# Patient Record
Sex: FEMALE | Race: WHITE | Employment: FULL TIME | URBAN - METROPOLITAN AREA
[De-identification: names, ages, dates, MRNs, and addresses within clinical notes are randomized per-mention and may not be internally consistent; named-entity substitution may affect disease eponyms.]

---

## 2023-05-26 LAB — TREPONEMA PALLIDUM ANTIBODY (EXTERNAL): NONREACTIVE

## 2023-07-24 LAB — GROUP B STREPTOCOCCUS (EXTERNAL): POSITIVE

## 2023-07-31 RX ORDER — PROMETHAZINE HYDROCHLORIDE 25 MG/1
25 TABLET ORAL EVERY 6 HOURS PRN
COMMUNITY

## 2023-07-31 RX ORDER — ASPIRIN 81 MG/1
81 TABLET ORAL DAILY
Status: ON HOLD | COMMUNITY
End: 2023-08-10

## 2023-07-31 RX ORDER — VITAMIN A ACETATE, .BETA.-CAROTENE, ASCORBIC ACID, CHOLECALCIFEROL, .ALPHA.-TOCOPHEROL ACETATE, DL-, THIAMINE MONONITRATE, RIBOFLAVIN, NIACINAMIDE, PYRIDOXINE HYDROCHLORIDE, FOLIC ACID, CYANOCOBALAMIN, CALCIUM CARBONATE, FERROUS FUMARATE, ZINC OXIDE, AND CUPRIC OXIDE 2000; 2000; 120; 400; 22; 1.84; 3; 20; 10; 1; 12; 200; 27; 25; 2 [IU]/1; [IU]/1; MG/1; [IU]/1; MG/1; MG/1; MG/1; MG/1; MG/1; MG/1; UG/1; MG/1; MG/1; MG/1; MG/1
1 TABLET ORAL DAILY
COMMUNITY

## 2023-08-05 NOTE — PHARMACY-ADMISSION MEDICATION HISTORY
Medication history and patient interview completed by pre-admitting nurse. Reviewed by pharmacist. No further clarifications needed.    Pasha Cuello RPh  ------------------------------------------------------  Status Changed by Time of change   Nurse Pinky Huerta RN Mon Jul 31, 2023  4:27 PM     Prior to Admission medications    Medication Sig Last Dose Taking? Auth Provider Long Term End Date   aspirin 81 MG EC tablet Take 81 mg by mouth daily  Yes Reported, Patient     Prenatal Vit-Fe Fumarate-FA (PNV PRENATAL PLUS MULTIVITAMIN) 27-1 MG TABS per tablet Take 1 tablet by mouth daily  Yes Reported, Patient     promethazine (PHENERGAN) 25 MG tablet Take 25 mg by mouth every 6 hours as needed for nausea  Yes Reported, Patient

## 2023-08-08 ENCOUNTER — ANESTHESIA (OUTPATIENT)
Dept: OBGYN | Facility: CLINIC | Age: 28
End: 2023-08-08
Payer: COMMERCIAL

## 2023-08-08 ENCOUNTER — ANESTHESIA EVENT (OUTPATIENT)
Dept: OBGYN | Facility: CLINIC | Age: 28
End: 2023-08-08
Payer: COMMERCIAL

## 2023-08-08 ENCOUNTER — HOSPITAL ENCOUNTER (INPATIENT)
Facility: CLINIC | Age: 28
LOS: 2 days | Discharge: HOME HEALTH | End: 2023-08-10
Attending: OBSTETRICS & GYNECOLOGY | Admitting: OBSTETRICS & GYNECOLOGY
Payer: COMMERCIAL

## 2023-08-08 LAB
ABO/RH(D): NORMAL
ANTIBODY SCREEN: NEGATIVE
ERYTHROCYTE [DISTWIDTH] IN BLOOD BY AUTOMATED COUNT: 13.4 % (ref 10–15)
HCT VFR BLD AUTO: 37.3 % (ref 35–47)
HGB BLD-MCNC: 12.2 G/DL (ref 11.7–15.7)
MCH RBC QN AUTO: 29.3 PG (ref 26.5–33)
MCHC RBC AUTO-ENTMCNC: 32.7 G/DL (ref 31.5–36.5)
MCV RBC AUTO: 89 FL (ref 78–100)
PLATELET # BLD AUTO: 251 10E3/UL (ref 150–450)
RBC # BLD AUTO: 4.17 10E6/UL (ref 3.8–5.2)
SPECIMEN EXPIRATION DATE: NORMAL
T PALLIDUM AB SER QL: NONREACTIVE
WBC # BLD AUTO: 11.3 10E3/UL (ref 4–11)

## 2023-08-08 PROCEDURE — 250N000011 HC RX IP 250 OP 636: Performed by: ANESTHESIOLOGY

## 2023-08-08 PROCEDURE — 86780 TREPONEMA PALLIDUM: CPT | Performed by: OBSTETRICS & GYNECOLOGY

## 2023-08-08 PROCEDURE — 86850 RBC ANTIBODY SCREEN: CPT | Performed by: OBSTETRICS & GYNECOLOGY

## 2023-08-08 PROCEDURE — 710N000010 HC RECOVERY PHASE 1, LEVEL 2, PER MIN: Performed by: OBSTETRICS & GYNECOLOGY

## 2023-08-08 PROCEDURE — 258N000003 HC RX IP 258 OP 636: Performed by: OBSTETRICS & GYNECOLOGY

## 2023-08-08 PROCEDURE — 85014 HEMATOCRIT: CPT | Performed by: OBSTETRICS & GYNECOLOGY

## 2023-08-08 PROCEDURE — 999N000079 HC STATISTIC IP LACTATION SERVICES 1-15 MIN

## 2023-08-08 PROCEDURE — 250N000011 HC RX IP 250 OP 636: Mod: JZ | Performed by: NURSE ANESTHETIST, CERTIFIED REGISTERED

## 2023-08-08 PROCEDURE — 120N000001 HC R&B MED SURG/OB

## 2023-08-08 PROCEDURE — 250N000009 HC RX 250: Performed by: NURSE ANESTHETIST, CERTIFIED REGISTERED

## 2023-08-08 PROCEDURE — 250N000011 HC RX IP 250 OP 636: Mod: JZ | Performed by: OBSTETRICS & GYNECOLOGY

## 2023-08-08 PROCEDURE — 258N000003 HC RX IP 258 OP 636: Performed by: NURSE ANESTHETIST, CERTIFIED REGISTERED

## 2023-08-08 PROCEDURE — 272N000001 HC OR GENERAL SUPPLY STERILE: Performed by: OBSTETRICS & GYNECOLOGY

## 2023-08-08 PROCEDURE — 250N000013 HC RX MED GY IP 250 OP 250 PS 637: Performed by: OBSTETRICS & GYNECOLOGY

## 2023-08-08 PROCEDURE — 250N000011 HC RX IP 250 OP 636: Performed by: OBSTETRICS & GYNECOLOGY

## 2023-08-08 PROCEDURE — 360N000076 HC SURGERY LEVEL 3, PER MIN: Performed by: OBSTETRICS & GYNECOLOGY

## 2023-08-08 PROCEDURE — 10D17ZZ EXTRACTION OF PRODUCTS OF CONCEPTION, RETAINED, VIA NATURAL OR ARTIFICIAL OPENING: ICD-10-PCS | Performed by: OBSTETRICS & GYNECOLOGY

## 2023-08-08 PROCEDURE — 370N000017 HC ANESTHESIA TECHNICAL FEE, PER MIN: Performed by: OBSTETRICS & GYNECOLOGY

## 2023-08-08 RX ORDER — AMOXICILLIN 250 MG
2 CAPSULE ORAL 2 TIMES DAILY
Status: DISCONTINUED | OUTPATIENT
Start: 2023-08-08 | End: 2023-08-10 | Stop reason: HOSPADM

## 2023-08-08 RX ORDER — SODIUM CHLORIDE, SODIUM LACTATE, POTASSIUM CHLORIDE, CALCIUM CHLORIDE 600; 310; 30; 20 MG/100ML; MG/100ML; MG/100ML; MG/100ML
INJECTION, SOLUTION INTRAVENOUS CONTINUOUS
Status: DISCONTINUED | OUTPATIENT
Start: 2023-08-08 | End: 2023-08-08 | Stop reason: HOSPADM

## 2023-08-08 RX ORDER — ONDANSETRON 4 MG/1
4 TABLET, ORALLY DISINTEGRATING ORAL EVERY 6 HOURS PRN
Status: DISCONTINUED | OUTPATIENT
Start: 2023-08-08 | End: 2023-08-10 | Stop reason: HOSPADM

## 2023-08-08 RX ORDER — OXYTOCIN 10 [USP'U]/ML
10 INJECTION, SOLUTION INTRAMUSCULAR; INTRAVENOUS
Status: DISCONTINUED | OUTPATIENT
Start: 2023-08-08 | End: 2023-08-10 | Stop reason: HOSPADM

## 2023-08-08 RX ORDER — ACETAMINOPHEN 325 MG/1
975 TABLET ORAL EVERY 6 HOURS
Status: DISCONTINUED | OUTPATIENT
Start: 2023-08-08 | End: 2023-08-10 | Stop reason: HOSPADM

## 2023-08-08 RX ORDER — METOCLOPRAMIDE 10 MG/1
10 TABLET ORAL EVERY 6 HOURS PRN
Status: DISCONTINUED | OUTPATIENT
Start: 2023-08-08 | End: 2023-08-10 | Stop reason: HOSPADM

## 2023-08-08 RX ORDER — LIDOCAINE 40 MG/G
CREAM TOPICAL
Status: DISCONTINUED | OUTPATIENT
Start: 2023-08-08 | End: 2023-08-08 | Stop reason: HOSPADM

## 2023-08-08 RX ORDER — ONDANSETRON 2 MG/ML
4 INJECTION INTRAMUSCULAR; INTRAVENOUS EVERY 6 HOURS PRN
Status: DISCONTINUED | OUTPATIENT
Start: 2023-08-08 | End: 2023-08-10 | Stop reason: HOSPADM

## 2023-08-08 RX ORDER — FENTANYL CITRATE-0.9 % NACL/PF 10 MCG/ML
100 PLASTIC BAG, INJECTION (ML) INTRAVENOUS EVERY 5 MIN PRN
Status: DISCONTINUED | OUTPATIENT
Start: 2023-08-08 | End: 2023-08-08 | Stop reason: HOSPADM

## 2023-08-08 RX ORDER — MORPHINE SULFATE 1 MG/ML
INJECTION, SOLUTION EPIDURAL; INTRATHECAL; INTRAVENOUS
Status: COMPLETED | OUTPATIENT
Start: 2023-08-08 | End: 2023-08-08

## 2023-08-08 RX ORDER — METHYLERGONOVINE MALEATE 0.2 MG/ML
200 INJECTION INTRAVENOUS
Status: DISCONTINUED | OUTPATIENT
Start: 2023-08-08 | End: 2023-08-08 | Stop reason: HOSPADM

## 2023-08-08 RX ORDER — METOCLOPRAMIDE HYDROCHLORIDE 5 MG/ML
10 INJECTION INTRAMUSCULAR; INTRAVENOUS EVERY 6 HOURS PRN
Status: DISCONTINUED | OUTPATIENT
Start: 2023-08-08 | End: 2023-08-10 | Stop reason: HOSPADM

## 2023-08-08 RX ORDER — MISOPROSTOL 200 UG/1
400 TABLET ORAL
Status: DISCONTINUED | OUTPATIENT
Start: 2023-08-08 | End: 2023-08-10 | Stop reason: HOSPADM

## 2023-08-08 RX ORDER — CEFAZOLIN SODIUM/WATER 3 G/30 ML
3 SYRINGE (ML) INTRAVENOUS
Status: COMPLETED | OUTPATIENT
Start: 2023-08-08 | End: 2023-08-08

## 2023-08-08 RX ORDER — ENOXAPARIN SODIUM 100 MG/ML
40 INJECTION SUBCUTANEOUS EVERY 12 HOURS
Status: DISCONTINUED | OUTPATIENT
Start: 2023-08-08 | End: 2023-08-10 | Stop reason: HOSPADM

## 2023-08-08 RX ORDER — NALOXONE HYDROCHLORIDE 0.4 MG/ML
0.2 INJECTION, SOLUTION INTRAMUSCULAR; INTRAVENOUS; SUBCUTANEOUS
Status: DISCONTINUED | OUTPATIENT
Start: 2023-08-08 | End: 2023-08-08

## 2023-08-08 RX ORDER — OXYTOCIN/0.9 % SODIUM CHLORIDE 30/500 ML
100-340 PLASTIC BAG, INJECTION (ML) INTRAVENOUS CONTINUOUS PRN
Status: DISCONTINUED | OUTPATIENT
Start: 2023-08-08 | End: 2023-08-10 | Stop reason: HOSPADM

## 2023-08-08 RX ORDER — NALBUPHINE HYDROCHLORIDE 20 MG/ML
2.5-5 INJECTION, SOLUTION INTRAMUSCULAR; INTRAVENOUS; SUBCUTANEOUS EVERY 6 HOURS PRN
Status: DISCONTINUED | OUTPATIENT
Start: 2023-08-08 | End: 2023-08-08

## 2023-08-08 RX ORDER — OXYTOCIN/0.9 % SODIUM CHLORIDE 30/500 ML
340 PLASTIC BAG, INJECTION (ML) INTRAVENOUS CONTINUOUS PRN
Status: DISCONTINUED | OUTPATIENT
Start: 2023-08-08 | End: 2023-08-08 | Stop reason: HOSPADM

## 2023-08-08 RX ORDER — CARBOPROST TROMETHAMINE 250 UG/ML
250 INJECTION, SOLUTION INTRAMUSCULAR
Status: DISCONTINUED | OUTPATIENT
Start: 2023-08-08 | End: 2023-08-08 | Stop reason: HOSPADM

## 2023-08-08 RX ORDER — SODIUM CHLORIDE, SODIUM LACTATE, POTASSIUM CHLORIDE, CALCIUM CHLORIDE 600; 310; 30; 20 MG/100ML; MG/100ML; MG/100ML; MG/100ML
INJECTION, SOLUTION INTRAVENOUS CONTINUOUS PRN
Status: DISCONTINUED | OUTPATIENT
Start: 2023-08-08 | End: 2023-08-08

## 2023-08-08 RX ORDER — IBUPROFEN 800 MG/1
800 TABLET, FILM COATED ORAL EVERY 6 HOURS
Status: DISCONTINUED | OUTPATIENT
Start: 2023-08-09 | End: 2023-08-10 | Stop reason: HOSPADM

## 2023-08-08 RX ORDER — CARBOPROST TROMETHAMINE 250 UG/ML
250 INJECTION, SOLUTION INTRAMUSCULAR
Status: DISCONTINUED | OUTPATIENT
Start: 2023-08-08 | End: 2023-08-10 | Stop reason: HOSPADM

## 2023-08-08 RX ORDER — SIMETHICONE 80 MG
80 TABLET,CHEWABLE ORAL 4 TIMES DAILY PRN
Status: DISCONTINUED | OUTPATIENT
Start: 2023-08-08 | End: 2023-08-10 | Stop reason: HOSPADM

## 2023-08-08 RX ORDER — PROCHLORPERAZINE 25 MG
25 SUPPOSITORY, RECTAL RECTAL EVERY 12 HOURS PRN
Status: DISCONTINUED | OUTPATIENT
Start: 2023-08-08 | End: 2023-08-10 | Stop reason: HOSPADM

## 2023-08-08 RX ORDER — HYDROCORTISONE 25 MG/G
CREAM TOPICAL 3 TIMES DAILY PRN
Status: DISCONTINUED | OUTPATIENT
Start: 2023-08-08 | End: 2023-08-10 | Stop reason: HOSPADM

## 2023-08-08 RX ORDER — OXYTOCIN 10 [USP'U]/ML
10 INJECTION, SOLUTION INTRAMUSCULAR; INTRAVENOUS
Status: DISCONTINUED | OUTPATIENT
Start: 2023-08-08 | End: 2023-08-08 | Stop reason: HOSPADM

## 2023-08-08 RX ORDER — NALOXONE HYDROCHLORIDE 0.4 MG/ML
0.4 INJECTION, SOLUTION INTRAMUSCULAR; INTRAVENOUS; SUBCUTANEOUS
Status: DISCONTINUED | OUTPATIENT
Start: 2023-08-08 | End: 2023-08-08

## 2023-08-08 RX ORDER — CEFAZOLIN SODIUM/WATER 3 G/30 ML
3 SYRINGE (ML) INTRAVENOUS SEE ADMIN INSTRUCTIONS
Status: DISCONTINUED | OUTPATIENT
Start: 2023-08-08 | End: 2023-08-08 | Stop reason: HOSPADM

## 2023-08-08 RX ORDER — MISOPROSTOL 200 UG/1
400 TABLET ORAL
Status: DISCONTINUED | OUTPATIENT
Start: 2023-08-08 | End: 2023-08-08 | Stop reason: HOSPADM

## 2023-08-08 RX ORDER — BUPIVACAINE HYDROCHLORIDE 7.5 MG/ML
INJECTION, SOLUTION INTRASPINAL
Status: COMPLETED | OUTPATIENT
Start: 2023-08-08 | End: 2023-08-08

## 2023-08-08 RX ORDER — PROCHLORPERAZINE MALEATE 10 MG
10 TABLET ORAL EVERY 6 HOURS PRN
Status: DISCONTINUED | OUTPATIENT
Start: 2023-08-08 | End: 2023-08-10 | Stop reason: HOSPADM

## 2023-08-08 RX ORDER — OXYCODONE HYDROCHLORIDE 5 MG/1
5 TABLET ORAL EVERY 4 HOURS PRN
Status: DISCONTINUED | OUTPATIENT
Start: 2023-08-08 | End: 2023-08-10 | Stop reason: HOSPADM

## 2023-08-08 RX ORDER — TRANEXAMIC ACID 10 MG/ML
1 INJECTION, SOLUTION INTRAVENOUS EVERY 30 MIN PRN
Status: DISCONTINUED | OUTPATIENT
Start: 2023-08-08 | End: 2023-08-10 | Stop reason: HOSPADM

## 2023-08-08 RX ORDER — MODIFIED LANOLIN
OINTMENT (GRAM) TOPICAL
Status: DISCONTINUED | OUTPATIENT
Start: 2023-08-08 | End: 2023-08-10 | Stop reason: HOSPADM

## 2023-08-08 RX ORDER — EPHEDRINE SULFATE 50 MG/ML
INJECTION, SOLUTION INTRAMUSCULAR; INTRAVENOUS; SUBCUTANEOUS PRN
Status: DISCONTINUED | OUTPATIENT
Start: 2023-08-08 | End: 2023-08-08

## 2023-08-08 RX ORDER — MISOPROSTOL 200 UG/1
800 TABLET ORAL
Status: DISCONTINUED | OUTPATIENT
Start: 2023-08-08 | End: 2023-08-10 | Stop reason: HOSPADM

## 2023-08-08 RX ORDER — OXYTOCIN/0.9 % SODIUM CHLORIDE 30/500 ML
340 PLASTIC BAG, INJECTION (ML) INTRAVENOUS CONTINUOUS PRN
Status: DISCONTINUED | OUTPATIENT
Start: 2023-08-08 | End: 2023-08-10 | Stop reason: HOSPADM

## 2023-08-08 RX ORDER — ACETAMINOPHEN 325 MG/1
975 TABLET ORAL ONCE
Status: COMPLETED | OUTPATIENT
Start: 2023-08-08 | End: 2023-08-08

## 2023-08-08 RX ORDER — BISACODYL 10 MG
10 SUPPOSITORY, RECTAL RECTAL DAILY PRN
Status: DISCONTINUED | OUTPATIENT
Start: 2023-08-10 | End: 2023-08-10 | Stop reason: HOSPADM

## 2023-08-08 RX ORDER — KETOROLAC TROMETHAMINE 30 MG/ML
INJECTION, SOLUTION INTRAMUSCULAR; INTRAVENOUS PRN
Status: DISCONTINUED | OUTPATIENT
Start: 2023-08-08 | End: 2023-08-08

## 2023-08-08 RX ORDER — OXYTOCIN/0.9 % SODIUM CHLORIDE 30/500 ML
PLASTIC BAG, INJECTION (ML) INTRAVENOUS PRN
Status: DISCONTINUED | OUTPATIENT
Start: 2023-08-08 | End: 2023-08-08

## 2023-08-08 RX ORDER — ONDANSETRON 2 MG/ML
INJECTION INTRAMUSCULAR; INTRAVENOUS PRN
Status: DISCONTINUED | OUTPATIENT
Start: 2023-08-08 | End: 2023-08-08

## 2023-08-08 RX ORDER — METHYLERGONOVINE MALEATE 0.2 MG/ML
200 INJECTION INTRAVENOUS
Status: DISCONTINUED | OUTPATIENT
Start: 2023-08-08 | End: 2023-08-10 | Stop reason: HOSPADM

## 2023-08-08 RX ORDER — KETOROLAC TROMETHAMINE 30 MG/ML
30 INJECTION, SOLUTION INTRAMUSCULAR; INTRAVENOUS EVERY 6 HOURS
Status: COMPLETED | OUTPATIENT
Start: 2023-08-08 | End: 2023-08-09

## 2023-08-08 RX ORDER — CITRIC ACID/SODIUM CITRATE 334-500MG
30 SOLUTION, ORAL ORAL
Status: COMPLETED | OUTPATIENT
Start: 2023-08-08 | End: 2023-08-08

## 2023-08-08 RX ORDER — AMOXICILLIN 250 MG
1 CAPSULE ORAL 2 TIMES DAILY
Status: DISCONTINUED | OUTPATIENT
Start: 2023-08-08 | End: 2023-08-10 | Stop reason: HOSPADM

## 2023-08-08 RX ORDER — TRANEXAMIC ACID 10 MG/ML
1 INJECTION, SOLUTION INTRAVENOUS EVERY 30 MIN PRN
Status: DISCONTINUED | OUTPATIENT
Start: 2023-08-08 | End: 2023-08-08 | Stop reason: HOSPADM

## 2023-08-08 RX ORDER — FENTANYL CITRATE 50 UG/ML
INJECTION, SOLUTION INTRAMUSCULAR; INTRAVENOUS
Status: COMPLETED | OUTPATIENT
Start: 2023-08-08 | End: 2023-08-08

## 2023-08-08 RX ORDER — DEXAMETHASONE SODIUM PHOSPHATE 4 MG/ML
INJECTION, SOLUTION INTRA-ARTICULAR; INTRALESIONAL; INTRAMUSCULAR; INTRAVENOUS; SOFT TISSUE PRN
Status: DISCONTINUED | OUTPATIENT
Start: 2023-08-08 | End: 2023-08-08

## 2023-08-08 RX ORDER — LIDOCAINE 40 MG/G
CREAM TOPICAL
Status: DISCONTINUED | OUTPATIENT
Start: 2023-08-08 | End: 2023-08-10 | Stop reason: HOSPADM

## 2023-08-08 RX ORDER — MISOPROSTOL 200 UG/1
800 TABLET ORAL
Status: DISCONTINUED | OUTPATIENT
Start: 2023-08-08 | End: 2023-08-08 | Stop reason: HOSPADM

## 2023-08-08 RX ORDER — DEXTROSE, SODIUM CHLORIDE, SODIUM LACTATE, POTASSIUM CHLORIDE, AND CALCIUM CHLORIDE 5; .6; .31; .03; .02 G/100ML; G/100ML; G/100ML; G/100ML; G/100ML
INJECTION, SOLUTION INTRAVENOUS CONTINUOUS
Status: DISCONTINUED | OUTPATIENT
Start: 2023-08-08 | End: 2023-08-10 | Stop reason: HOSPADM

## 2023-08-08 RX ADMIN — KETOROLAC TROMETHAMINE 30 MG: 30 INJECTION INTRAMUSCULAR; INTRAVENOUS at 23:33

## 2023-08-08 RX ADMIN — Medication 10 MG: at 09:25

## 2023-08-08 RX ADMIN — Medication 10 MG: at 09:22

## 2023-08-08 RX ADMIN — DEXAMETHASONE SODIUM PHOSPHATE 8 MG: 4 INJECTION, SOLUTION INTRA-ARTICULAR; INTRALESIONAL; INTRAMUSCULAR; INTRAVENOUS; SOFT TISSUE at 09:18

## 2023-08-08 RX ADMIN — SODIUM CHLORIDE, POTASSIUM CHLORIDE, SODIUM LACTATE AND CALCIUM CHLORIDE: 600; 310; 30; 20 INJECTION, SOLUTION INTRAVENOUS at 09:25

## 2023-08-08 RX ADMIN — ACETAMINOPHEN 975 MG: 325 TABLET, FILM COATED ORAL at 21:11

## 2023-08-08 RX ADMIN — OXYTOCIN-SODIUM CHLORIDE 0.9% IV SOLN 30 UNIT/500ML 500 ML: 30-0.9/5 SOLUTION at 10:31

## 2023-08-08 RX ADMIN — Medication 0.2 MG: at 09:10

## 2023-08-08 RX ADMIN — Medication 5 MG: at 09:42

## 2023-08-08 RX ADMIN — METOCLOPRAMIDE HYDROCHLORIDE 10 MG: 5 INJECTION INTRAMUSCULAR; INTRAVENOUS at 14:34

## 2023-08-08 RX ADMIN — Medication 3 G: at 09:09

## 2023-08-08 RX ADMIN — ACETAMINOPHEN 975 MG: 325 TABLET, FILM COATED ORAL at 08:33

## 2023-08-08 RX ADMIN — SODIUM CHLORIDE, POTASSIUM CHLORIDE, SODIUM LACTATE AND CALCIUM CHLORIDE: 600; 310; 30; 20 INJECTION, SOLUTION INTRAVENOUS at 09:07

## 2023-08-08 RX ADMIN — TRANEXAMIC ACID 1 G: 1 INJECTION, SOLUTION INTRAVENOUS at 09:51

## 2023-08-08 RX ADMIN — PHENYLEPHRINE HYDROCHLORIDE 30 MCG/MIN: 10 INJECTION INTRAVENOUS at 09:11

## 2023-08-08 RX ADMIN — BUPIVACAINE HYDROCHLORIDE IN DEXTROSE 1.7 ML: 7.5 INJECTION, SOLUTION SUBARACHNOID at 09:10

## 2023-08-08 RX ADMIN — ENOXAPARIN SODIUM 40 MG: 40 INJECTION SUBCUTANEOUS at 23:33

## 2023-08-08 RX ADMIN — SODIUM CHLORIDE, SODIUM LACTATE, POTASSIUM CHLORIDE, CALCIUM CHLORIDE AND DEXTROSE MONOHYDRATE: 5; 600; 310; 30; 20 INJECTION, SOLUTION INTRAVENOUS at 15:10

## 2023-08-08 RX ADMIN — SENNOSIDES AND DOCUSATE SODIUM 1 TABLET: 50; 8.6 TABLET ORAL at 21:11

## 2023-08-08 RX ADMIN — KETOROLAC TROMETHAMINE 30 MG: 30 INJECTION INTRAMUSCULAR; INTRAVENOUS at 17:37

## 2023-08-08 RX ADMIN — ONDANSETRON 4 MG: 2 INJECTION INTRAMUSCULAR; INTRAVENOUS at 09:18

## 2023-08-08 RX ADMIN — SODIUM CITRATE AND CITRIC ACID MONOHYDRATE 30 ML: 500; 334 SOLUTION ORAL at 08:34

## 2023-08-08 RX ADMIN — SODIUM CHLORIDE, POTASSIUM CHLORIDE, SODIUM LACTATE AND CALCIUM CHLORIDE 500 ML: 600; 310; 30; 20 INJECTION, SOLUTION INTRAVENOUS at 07:50

## 2023-08-08 RX ADMIN — ACETAMINOPHEN 975 MG: 325 TABLET, FILM COATED ORAL at 14:36

## 2023-08-08 RX ADMIN — SODIUM CHLORIDE, SODIUM LACTATE, POTASSIUM CHLORIDE, CALCIUM CHLORIDE AND DEXTROSE MONOHYDRATE: 5; 600; 310; 30; 20 INJECTION, SOLUTION INTRAVENOUS at 14:36

## 2023-08-08 RX ADMIN — FENTANYL CITRATE 15 MCG: 50 INJECTION, SOLUTION INTRAMUSCULAR; INTRAVENOUS at 09:10

## 2023-08-08 RX ADMIN — KETOROLAC TROMETHAMINE 30 MG: 30 INJECTION, SOLUTION INTRAMUSCULAR at 10:30

## 2023-08-08 RX ADMIN — OXYTOCIN-SODIUM CHLORIDE 0.9% IV SOLN 30 UNIT/500ML 500 ML: 30-0.9/5 SOLUTION at 09:51

## 2023-08-08 ASSESSMENT — ACTIVITIES OF DAILY LIVING (ADL)
ADLS_ACUITY_SCORE: 21
ADLS_ACUITY_SCORE: 20
ADLS_ACUITY_SCORE: 21
ADLS_ACUITY_SCORE: 20
ADLS_ACUITY_SCORE: 21
ADLS_ACUITY_SCORE: 21

## 2023-08-08 NOTE — ANESTHESIA POSTPROCEDURE EVALUATION
Patient: Em Cormier    Procedure: Procedure(s):  repeat  section       Anesthesia Type:  Spinal    Note:  Disposition: Inpatient   Postop Pain Control: Uneventful            Sign Out: Well controlled pain   PONV: No   Neuro/Psych: Uneventful            Sign Out: Acceptable/Baseline neuro status   Airway/Respiratory: Uneventful            Sign Out: Acceptable/Baseline resp. status   CV/Hemodynamics: Uneventful            Sign Out: Acceptable CV status; No obvious hypovolemia; No obvious fluid overload   Other NRE:    DID A NON-ROUTINE EVENT OCCUR? No           Last vitals:  Vitals Value Taken Time   /57 23 1156   Temp 98  F (36.7  C) 23 1052   Pulse     Resp 18 23 1123   SpO2 99 % 23 1201   Vitals shown include unvalidated device data.    Electronically Signed By: Jennifer Yang MD  2023  12:02 PM

## 2023-08-08 NOTE — PLAN OF CARE
Data: [facility] presented to [facility]: 2023  7:07 AM.  Patient admitted scheduled repeat  section. Patient is a .  Prenatal record reviewed. Pregnancy  has been complicated by obesity and cHTN w/o medication.  Gestational Age 39w2d. VSS. Fetal movement active. Patient denies uterine contractions, leaking of vaginal fluid/rupture of membranes, vaginal bleeding, abdominal pain, pelvic pressure, nausea, vomiting, headache, visual disturbances, epigastric or URQ pain, significant edema. Support person is present.   Action: Verbal consent for EFM.  Admission assessment completed. Bill of rights reviewed. Orders released per Dr. Hansen. Pre-procedure care initiated - IV placed, labs drawn and sent.   Response: Patient verbalized agreement with plan. Plan for  section at 0900.

## 2023-08-08 NOTE — DISCHARGE SUMMARY
"[facility]    Discharge Summary  Obstetrics    Date of Admission:  2023  Date of Discharge:  8/10/2023  Discharging Provider: Furuseth  Date of Service (when I saw the patient): 08/10/23    Discharge Diagnoses   -  delivery, delivered at 39 weeks GA  - Obesity  - Rubella non-immune  - cHTN no meds  -s/p PPH, s/p IV iron, transfusion    Procedure/Surgery Information   Procedure: Procedure(s):  repeat  section   Surgeon(s): Surgeon(s) and Role:     * José Miguel Hansen MD - Primary     * Evelyne Giordano DO - Assisting     History of Present Illness   Em Cormier is a 27 year old female  at 39.1wks GA w/ Hx of LTCS x1 desiring scheduled repeat. Pregnancy complicated by: BMI 50+, cHTN no meds, Hx macrosomia, Rubella non-immune, GBS+, Hx gDM, N/V (resolved), Hx ADHD (no meds).     Hospital Course   Patient had routine surgery, see note for complete details. Repeat scheduled LTCS performed with partner. Tye to NICU on CPAP. QBL 888mL with atony, placental lakes, no accreta but \"sticky\" anterior placental edge. Minimal adhesions, though very thick rectus abdominus/peritoneum.    The patient's postpartum course was unremarkable.  She recovered as anticipated and experienced no post-operative complications.  On discharge, her pain was well controlled. Vaginal bleeding is similar to peak menstrual flow.  Voiding without difficulty.  Ambulating well and tolerating a normal diet.  No fever or significant wound drainage.  Breastfeeding well.  Infant is stable.  She was discharged on post-partum day #2. She was given lovenox ppx BID while inpatient. Prior to discharge she was given MMR vaccine.     Her BP was controlled without medication. She has BP cuff at home.     PP BC undecided.    Post-partum hemoglobin:   Hemoglobin   Date Value Ref Range Status   2023 12.2 11.7 - 15.7 g/dL Final       Discharge Disposition   Discharged to home   Condition at discharge: " Stable    Pending Results   Final pathology results: No pathology submitted  These results will be followed up by N/A  Unresulted Labs Ordered in the Past 30 Days of this Admission       Date and Time Order Name Status Description    8/8/2023  7:13 AM Treponema Abs w Reflex to RPR and Titer In process             Primary Care Physician   José Miguel Hansen    Consultations This Hospital Stay   LACTATION IP CONSULT    Discharge Orders   No discharge procedures on file.  Discharge Medications   Current Discharge Medication List        CONTINUE these medications which have NOT CHANGED    Details   aspirin 81 MG EC tablet Take 81 mg by mouth daily      Prenatal Vit-Fe Fumarate-FA (PNV PRENATAL PLUS MULTIVITAMIN) 27-1 MG TABS per tablet Take 1 tablet by mouth daily      promethazine (PHENERGAN) 25 MG tablet Take 25 mg by mouth every 6 hours as needed for nausea           Allergies   No Known Allergies

## 2023-08-08 NOTE — OP NOTE
POST OPERATIVE NOTE-IMMEDIATE    Preoperative Diagnosis   1. Intrauterine pregnancy, 39w2d  2. Hx LTCS x1  3. cHTN, no meds  4. Morbid obesity  5. GBS+  6. Hx Macrosomia  7. Rubella non-immune    Postoperative  Diagnosis   1. Same and  delivery, delivered    Operative Procedure  1.  Repeat low transverse  section.    Surgeon(s) and Assistants (if any): Surgeon(s):  Evelyne Giordano DO Rakoff, David, MD    OR Staff:  Circulator: Sherice Trejo RN  Nurse Practitioner: Emily Fernando APRN CNP  Scrub Person: Bisi Patton  Baby : Bee Robles RN  NICU Nurse: Emelina Burch RN  Respiratory Therapist: Ron Allen RT    Anesthesia:  Spinal    Drains:  Armstrong    Specimens:    Mixed cord blood    Complications: None     Findings/Conclusions:    Viable, cephalic, male infant. 3980g ([id number]). Agars of 4 at 1 minute and 6 at 5 minutes and 7 at 10 minutes.  Normal ovaries and fallopian tubes  Anterior myometrium submucosal layer detached partially with hysterotomy, reattached with single layer closure.  Dense peritoneal and rectus abdominus layer without significant deep intraperitoneal adhesions.  Anterior placenta with accessory bleeders (main source of blood loss), the placenta was slightly adherent to anterior abdominal wall, but no evidence of accreta and placental tissue removed completely.    QBL: 888mL    Condition on discharge from OR: Satisfactory    The patient is a 27 year old Female: 1. Intrauterine pregnancy, 39w2d,   who presents for repeat .  There were complications in the pregnancy as above.  All the risks, benefits, and alternatives were discussed with the patient including risk of anesthesia, infection, blood loss (need for transfusion), damage to other organs including bowel and bladder, and death.     OPERATIVE NOTE  With IV fluids running, the patient was brought to the operating room.  The patient transferred to the table.  Briefing  completed. She was induced with spinal anesthesia, prepped and draped in the usual fashion, with exception that traxi was added. Timeout completed. 3g ppx Ancef given. A low transverse incision was made two finger breadths above pubic bone. The incision was carried down to the fascia.  The fascia was reflected cephalocaudad with blunt and sharp dissection.  The peritoneum was then entered and the midline extended cephalocaudad with blunt and sharp dissection.  The large nisreen retractor was then placed.  The vesicouterine peritoneum was reflected off the lower uterine segment.  A low transverse incision was then made into the intrauterine cavity in a curvilinear fashion.  This was extended laterally with blunt countertraction in cephalad direction.  The infant head was elevated to hysterotomy and then delivered without complications or difficulty. Humble was vigorous and so delayed cord clamping performed. The cord was then doubly clamped and cut.  Mixed cord blood collected. The infant was passed off to the nurses in attendance, NICU team called who eventually admitted  on CPAP in stable condition.  The placenta delivered manually intact.  IV pitocin running and hysterotomy edges were controlled with ring forceps, 1g TXA administered. The uterine cavity was cleaned multiple times without evidence of retained products and no evidence of accreta. Initial atony reversed with massage and wide open IV pitocin.  The uterus was exteriorized with wet lap, uterine incision was then closed with 0 Monocryl in a running locking fashion.  The cul-de-sac was then cleaned and then the nisreen retractor was removed.  The fascia was closed with 0 Vicryl.  The subcutaneous tissue was cleaned and then closed with 2-0 Vicryl in x2 layers.  Skin was closed with 4-0 monocryl in subcuticular fashion then reinforced with glue. Pressure bandage applied with ABD and foam tape. Instrument, needle, and sponge counts were reported to be  correct. Debriefing completed. The patient tolerated the procedure well and went to the recovery room in stable condition.      José Miguel Hansen MD ....................  8/8/2023   11:08 AM

## 2023-08-08 NOTE — ANESTHESIA PROCEDURE NOTES
"Intrathecal injection Procedure Note    Pre-Procedure   Staff -        Performed By: anesthesiologist       Location: OR       Procedure Start/Stop Times: 8/8/2023 9:10 AM and 8/8/2023 9:14 AM       Pre-Anesthestic Checklist: patient identified, IV checked, risks and benefits discussed, informed consent, monitors and equipment checked, pre-op evaluation and at physician/surgeon's request  Timeout:       Correct Patient: Yes        Correct Procedure: Yes        Correct Site: Yes        Correct Position: Yes   Procedure Documentation  Procedure: intrathecal injection       Patient Position: sitting       Skin prep: Chloraprep       Insertion Site: L3-4. (midline approach).       Needle Gauge: 24.        Needle Length (Inches): 3.5        Spinal Needle Type: Pencan       Introducer used       # of attempts: 1 and  # of redirects:  2    Assessment/Narrative         Sensory Level: T6       CSF fluid: clear.       Opening pressure was cmH2O while  Sitting.      Medication(s) Administered   0.75% Hyperbaric Bupivacaine (Intrathecal) - Intrathecal   1.7 mL - 8/8/2023 9:10:00 AM  Fentanyl PF (Intrathecal) - Intrathecal   15 mcg - 8/8/2023 9:10:00 AM  Morphine PF 1 mg/mL (Intrathecal) - Intrathecal   0.2 mg - 8/8/2023 9:10:00 AM  Medication Administration Time: 8/8/2023 9:10 AM      FOR [facility] ([facility]) ONLY:   Pain Team Contact information: please page the Pain Team Via [employer]. Search \"Pain\". During daytime hours, please page the attending first. At night please page the resident first.      "

## 2023-08-08 NOTE — DISCHARGE INSTRUCTIONS
"Postpartum Discharge Instructions  ACTIVITY:  - You may ride in a car, but no driving for 1-2 weeks.  - Do not lift anything heavier than your baby for 6 weeks.  - You may slowly go up and down stairs as you feel able.  - Resume other exercises after 6 weeks.  - Rest when your baby is sleeping.  - Call your doctor if you are feeling \"blue\" for more than 2 weeks.  - Call your doctor immediately or go the Emergency Center if you think you might hurt yourself or your baby.  HYGIENE:  - You may take a tub bath or shower.  - Continue using a moises bottle or sitz bath for comfort or cleanliness.  - No douching or tampon use until after 6 week checkup.  DIET:  - Wait 6 weeks before dieting to lose weight.  - Aim for gradual weight loss through healthy eating habits.  - Take a vitamin daily unless otherwise directed.  BREASTFEEDING:  - Refer to Breastfeeding Guidelines booklet or call Breastfeeding support [place]: [phone]  MEDICATIONS:  - Use as directed on prescription.  PAIN MANAGEMENT:    Breast Care:  - If not breastfeeding, apply ice packs to your breasts 3 times per day for 15 minutes.  Wear a tight bra for at least one week.  - If breastfeeding, nurse often to get relief, pain medication as directed.  IF Laceration:  - Continue use of moises bottle and sitz bath as directed.  - Use Dermoplast and/or Tucks as directed.  IF  Incision:  - Splint incision when moving and turning.  - Medications as instructed.  SPECIAL INFORMATION:  - No sexual intercourse for 6 weeks.  After that, use a barrier contraception until your doctor tells you it is ok to use something different.     - Breastfeeding is not a method of birth control.  - You may have a period while breastfeeding.  Your first period may come 4 to 10 weeks after delivery, or later if you are breastfeeding.  - Avoid constipation.  Drink plenty of water, eat vegetables and fruits high in natural fiber, high grain breads and cereals.  You may use a stool " softener as necessary.  COMPLICATIONS:  Call you doctor if any of the following occur:  - Continuing bright red vaginal bleeding or clots larger than a lemon.  - Pain or redness in the breasts.  - Fever over 100.4 when temperature is taken by mouth.  - Burning feeling with urination.  - Bad smelling vaginal drainage.  - Incision or episiotomy pulls apart, is red or has draiage.  -------------------------------  BLOOD PRESSURE MONITORING  - please check your blood pressure twice daily (morning/evening)  - write down your blood pressures in a book so we can review them in upcoming visits  - if you have any blood pressure that: systolic (upper number) is 160 or more, and/or diastolic (lower number) is 110 or above, call us immediately for further instructions. If your blood pressure persists to be severely elevated on subsequent checks done every 15 min, then please direct yourself to the ED for further evaluation.   - pre-eclampsia signs and symptoms you should be aware of are: headache that does not resolve with Tylenol, spots in your vision, worsening/generalized swelling, right upper and mid upper abdominal pain, tender to the touch and not resolving with Tylenol/Ibuprofen. Please notify us immediately about this symptoms.

## 2023-08-08 NOTE — PLAN OF CARE
Data: Vital signs within normal limits. Postpartum checks within normal limits - see flow record. Patient eating and drinking normally. Patient ambulating. No apparent signs of infection. Patient pumping every 3 hours.  Incision appears within normal. Rested in bed this shift.  Action: Patient medicated during the shift for  incision pain . See MAR. Patient reassessed within 1 hour after each medication and pain was improved - patient stated she was comfortable. Patient education done, see flow record. iPad in room for access to NICU.   Response: Patient showing interest in baby's well being in NICU. Patient's significant other present this shift.   Plan: Continue current plan of care.

## 2023-08-08 NOTE — LACTATION NOTE
Lactation in to see patient. Baby in NICU for respiratory support, was on CPAP. Started patient pumping. Encouraged patient to watch and do Hand expression and do after pumping. Educated on pumping every 3 hours. Cleaning and care for pump parts. Reviewed pump setting, milk storage. Has nursed her other child. Patient stated she didn't make lots of milk. Discussed STS when infant stable. Has a Medela and a wireless pump at home. Knows to call for assistance or questions.

## 2023-08-08 NOTE — ANESTHESIA CARE TRANSFER NOTE
Patient: Em Cormier    Procedure: Procedure(s):  repeat  section       Diagnosis: Previous  section [Z98.891]  Diagnosis Additional Information: No value filed.    Anesthesia Type:   No value filed.     Note:    Oropharynx: oropharynx clear of all foreign objects  Level of Consciousness: awake  Oxygen Supplementation: room air    Independent Airway: airway patency satisfactory and stable  Dentition: dentition unchanged  Vital Signs Stable: post-procedure vital signs reviewed and stable  Report to RN Given: handoff report given  Patient transferred to: Labor and Delivery    Handoff Report: Identifed the Patient, Identified the Reponsible Provider, Reviewed the pertinent medical history, Discussed the surgical course, Reviewed Intra-OP anesthesia mangement and issues during anesthesia, Set expectations for post-procedure period and Allowed opportunity for questions and acknowledgement of understanding      Vitals:  Vitals Value Taken Time   /53 23 1052   Temp     Pulse     Resp     SpO2 100 % 23 1051   Vitals shown include unvalidated device data.    Electronically Signed By: RENAE Stokes CRNA  2023  10:54 AM

## 2023-08-08 NOTE — ANESTHESIA PREPROCEDURE EVALUATION
Anesthesia Pre-Procedure Evaluation    Patient: Em Cormier   MRN: [id number] : 1995        Procedure : Procedure(s):  repeat  section          History reviewed. No pertinent past medical history.   Past Surgical History:   Procedure Laterality Date     SECTION        No Known Allergies   Social History     Tobacco Use    Smoking status: Former     Types: Cigarettes     Quit date:      Years since quittin.6    Smokeless tobacco: Never   Substance Use Topics    Alcohol use: Not Currently      Wt Readings from Last 1 Encounters:   23 (!) 172.8 kg (381 lb)        Anesthesia Evaluation            ROS/MED HX  ENT/Pulmonary:       Neurologic:       Cardiovascular:     (+)  - - PIH  -  - -                                      METS/Exercise Tolerance:     Hematologic:       Musculoskeletal:       GI/Hepatic:       Renal/Genitourinary:       Endo:     (+)               Obesity (BMI 55),       Psychiatric/Substance Use:       Infectious Disease:       Malignancy:       Other:            Physical Exam    Airway        Mallampati: II   TM distance: > 3 FB   Neck ROM: full     Respiratory Devices and Support         Dental           Cardiovascular   cardiovascular exam normal          Pulmonary   pulmonary exam normal                OUTSIDE LABS:  CBC:   Lab Results   Component Value Date    WBC 11.3 (H) 2023    HGB 12.2 2023    HCT 37.3 2023     2023     BMP: No results found for: NA, POTASSIUM, CHLORIDE, CO2, BUN, CR, GLC  COAGS: No results found for: PTT, INR, FIBR  POC: No results found for: BGM, HCG, HCGS  HEPATIC: No results found for: ALBUMIN, PROTTOTAL, ALT, AST, GGT, ALKPHOS, BILITOTAL, BILIDIRECT, MYLA  OTHER: No results found for: PH, LACT, A1C, MARIANNE, PHOS, MAG, LIPASE, AMYLASE, TSH, T4, T3, CRP, SED    Anesthesia Plan    ASA Status:  3       Anesthesia Type: Spinal.              Consents    Anesthesia Plan(s) and associated risks, benefits,  and realistic alternatives discussed. Questions answered and patient/representative(s) expressed understanding.     - Discussed:     - Discussed with:  Patient            Postoperative Care            Comments:                Jennifer Yang MD

## 2023-08-08 NOTE — LETTER
[facility] Postpartum Home Care Referral  [facility]  [address]  [facility] [address]  Phone: [phone]  Fax: [phone]    Date of Referral: 8/10/2023    Em Cormier MRN# [id number]   Age: 27 year old YOB: 1995           Date of Admission:  2023  7:07 AM    Primary care provider: José Miguel Hansen  Attending Provider: José Miguel Hansne MD    Payor: [employer] / Plan: [employer] MA / Product Type: HMO /          Pregnancy History:   The details of the mother's pregnancy are as follows:  OBSTETRIC HISTORY:  @[age@  EDC: Estimated Date of Delivery: 23  OB History    Para Term  AB Living   3 1 1 0 1 1   SAB IAB Ectopic Multiple Live Births   0 1 0 0 1      # Outcome Date GA Lbr Silvio/2nd Weight Sex Delivery Anes PTL Lv   3 Current            2 IAB            1 Term                Prenatal Labs:   Lab Results   Component Value Date    AS Negative 2023    HGB 9.4 (L) 2023       GBS Status:  Lab Results   Component Value Date    GBS Positive (A) 2023              Maternal History:   History reviewed. No pertinent past medical history.                      Family History:   History reviewed. No pertinent family history.          Social History:     Social History     Tobacco Use    Smoking status: Former     Types: Cigarettes     Quit date:      Years since quittin.6    Smokeless tobacco: Never   Substance Use Topics    Alcohol use: Not Currently          Birth  History:     [place] Birth Information  Information for the patient's :  Eva Cormier [[id number]]     Birth History    Birth     Weight: 3.87 kg (8 lb 8.5 oz)    Apgar     One: 4     Five: 6     Ten: 7    Gestation Age: 39 2/7 wks        Information for the patient's :  Eva Cormier [[id number]]     Immunization History   Administered Date(s) Administered    Hepatitis B (Peds <19Y) 2023              [place] Information     Feeding plan:   Information for the patient's :  Genia TrungAlexandria [[id number]]         Latch:   Information for the patient's :  Genia TrungAlexandria [[id number]]        Information for the patient's :  Genia TrungAlexandria [[id number]]   Vitals  Pulse: 138  Resp: 38  Temp: 98  F (36.7  C)  Temp src: Axillary  SpO2: 100 %  BP: (!) 83/2  Cuff Mean (mmHg): 63  Cuff Size:  Size #5     Information for the patient's :  Genia TrungAlexandria [[id number]]       Information for the patient's :  Genia TrungAlexandria [[id number]]   Weight: 3.685 kg (8 lb 2 oz)    Information for the patient's :  Eva Cormier [[id number]]   Percent Weight Change Since Birth: -4.8      Information for the patient's :  Eva Cormier [[id number]]      Information for the patient's :  Eva Cormier [[id number]]        Bilirubin Results:     Information for the patient's :  Genia TrungAlexandria [[id number]]   No results for input(s): TCBIL, BILINEONATAL in the last 24220 hours.          Discharge Meds:        Medication List        Started      acetaminophen 325 MG tablet  Commonly known as: TYLENOL  975 mg, Oral, EVERY 6 HOURS     ferrous sulfate 325 (65 Fe) MG tablet  Commonly known as: FEROSUL  325 mg, Oral, EVERY OTHER DAY  Start taking on: 2023     hydrocortisone (Perianal) 2.5 % cream  Commonly known as: ANUSOL-HC  Rectal, 3 TIMES DAILY PRN     ibuprofen 800 MG tablet  Commonly known as: ADVIL/MOTRIN  800 mg, Oral, EVERY 6 HOURS     lanolin ointment  Topical, EVERY 1 HOUR PRN     oxyCODONE 5 MG tablet  Commonly known as: ROXICODONE  5 mg, Oral, EVERY 6 HOURS PRN     senna-docusate 8.6-50 MG tablet  Commonly known as: SENOKOT-S/PERICOLACE  1 tablet, Oral, DAILY PRN     simethicone 80 MG chewable tablet  Commonly known as: MYLICON  80 mg, Oral, 4 TIMES DAILY PRN            Discontinued      aspirin 81 MG EC tablet               Information for the patient's :  Genia Eva [[id number]]        Medication List      There are no discharge medications for this visit.              Summary of Plan of Care:     Home Care to draw [employer] Screen? No    Home Care Agency referred to: [facility]  Incision check and BP review in one week.      Viridiana Allen RN

## 2023-08-09 LAB
CREAT SERPL-MCNC: 0.72 MG/DL (ref 0.51–0.95)
GFR SERPL CREATININE-BSD FRML MDRD: >90 ML/MIN/1.73M2
HGB BLD-MCNC: 9.4 G/DL (ref 11.7–15.7)

## 2023-08-09 PROCEDURE — 250N000013 HC RX MED GY IP 250 OP 250 PS 637: Performed by: OBSTETRICS & GYNECOLOGY

## 2023-08-09 PROCEDURE — 85018 HEMOGLOBIN: CPT | Performed by: OBSTETRICS & GYNECOLOGY

## 2023-08-09 PROCEDURE — 82565 ASSAY OF CREATININE: CPT | Performed by: OBSTETRICS & GYNECOLOGY

## 2023-08-09 PROCEDURE — 250N000011 HC RX IP 250 OP 636: Mod: JZ | Performed by: OBSTETRICS & GYNECOLOGY

## 2023-08-09 PROCEDURE — 36415 COLL VENOUS BLD VENIPUNCTURE: CPT | Performed by: OBSTETRICS & GYNECOLOGY

## 2023-08-09 PROCEDURE — 120N000001 HC R&B MED SURG/OB

## 2023-08-09 RX ORDER — FERROUS SULFATE 325(65) MG
325 TABLET ORAL EVERY OTHER DAY
Status: DISCONTINUED | OUTPATIENT
Start: 2023-08-09 | End: 2023-08-10 | Stop reason: HOSPADM

## 2023-08-09 RX ORDER — NALOXONE HYDROCHLORIDE 0.4 MG/ML
0.4 INJECTION, SOLUTION INTRAMUSCULAR; INTRAVENOUS; SUBCUTANEOUS
Status: DISCONTINUED | OUTPATIENT
Start: 2023-08-09 | End: 2023-08-10 | Stop reason: HOSPADM

## 2023-08-09 RX ORDER — NALOXONE HYDROCHLORIDE 0.4 MG/ML
0.2 INJECTION, SOLUTION INTRAMUSCULAR; INTRAVENOUS; SUBCUTANEOUS
Status: DISCONTINUED | OUTPATIENT
Start: 2023-08-09 | End: 2023-08-10 | Stop reason: HOSPADM

## 2023-08-09 RX ORDER — FERROUS SULFATE 325(65) MG
325 TABLET ORAL EVERY OTHER DAY
Status: DISCONTINUED | OUTPATIENT
Start: 2023-08-09 | End: 2023-08-09

## 2023-08-09 RX ADMIN — IBUPROFEN 800 MG: 800 TABLET ORAL at 23:27

## 2023-08-09 RX ADMIN — ACETAMINOPHEN 975 MG: 325 TABLET, FILM COATED ORAL at 15:56

## 2023-08-09 RX ADMIN — ENOXAPARIN SODIUM 40 MG: 40 INJECTION SUBCUTANEOUS at 22:21

## 2023-08-09 RX ADMIN — OXYCODONE HYDROCHLORIDE 5 MG: 5 TABLET ORAL at 22:20

## 2023-08-09 RX ADMIN — OXYCODONE HYDROCHLORIDE 5 MG: 5 TABLET ORAL at 18:32

## 2023-08-09 RX ADMIN — OXYCODONE HYDROCHLORIDE 5 MG: 5 TABLET ORAL at 14:05

## 2023-08-09 RX ADMIN — OXYCODONE HYDROCHLORIDE 5 MG: 5 TABLET ORAL at 09:36

## 2023-08-09 RX ADMIN — IBUPROFEN 800 MG: 800 TABLET ORAL at 17:35

## 2023-08-09 RX ADMIN — ENOXAPARIN SODIUM 40 MG: 40 INJECTION SUBCUTANEOUS at 10:48

## 2023-08-09 RX ADMIN — IBUPROFEN 800 MG: 800 TABLET ORAL at 11:37

## 2023-08-09 RX ADMIN — ACETAMINOPHEN 975 MG: 325 TABLET, FILM COATED ORAL at 22:20

## 2023-08-09 RX ADMIN — SENNOSIDES AND DOCUSATE SODIUM 2 TABLET: 50; 8.6 TABLET ORAL at 22:21

## 2023-08-09 RX ADMIN — KETOROLAC TROMETHAMINE 30 MG: 30 INJECTION INTRAMUSCULAR; INTRAVENOUS at 05:35

## 2023-08-09 RX ADMIN — ACETAMINOPHEN 975 MG: 325 TABLET, FILM COATED ORAL at 03:44

## 2023-08-09 RX ADMIN — FERROUS SULFATE TAB 325 MG (65 MG ELEMENTAL FE) 325 MG: 325 (65 FE) TAB at 10:48

## 2023-08-09 RX ADMIN — ACETAMINOPHEN 975 MG: 325 TABLET, FILM COATED ORAL at 09:36

## 2023-08-09 RX ADMIN — SENNOSIDES AND DOCUSATE SODIUM 1 TABLET: 50; 8.6 TABLET ORAL at 10:48

## 2023-08-09 ASSESSMENT — ACTIVITIES OF DAILY LIVING (ADL)
ADLS_ACUITY_SCORE: 21

## 2023-08-09 NOTE — PROGRESS NOTES
Patient unavailable when Public Health Nurse (PHN) stopped by to discuss [facility] Public Health Resources.

## 2023-08-09 NOTE — PLAN OF CARE
Data: Vital signs within normal limits. Postpartum checks within normal limits - see flow record. Patient eating and drinking normally. Patient able to empty bladder independently and is up ambulating. No apparent signs of infection. Patient performing self cares, is able to care for infant and is breast and bottle feeding every 2-3 hours.  Incision appears within normal. Rested in bed this shift.  Action: Patient medicated during the shift for pain. See MAR. Patient reassessed within 1 hour after each medication and pain was improved - patient stated she was comfortable. Patient education done, see flow record.   Response: Positive attachment behaviors observed with infant. Patient's significant other present this shift.   Plan: Continue current plan of care.

## 2023-08-09 NOTE — LACTATION NOTE
This note was copied from a baby's chart.  Lactation visit; infant up from NICU this afternoon.  Per Em infant was on CPAP and was supplementing with DM and Em has been pumping every 3 hours.  Writer in room to assist with breastfeed but Em wanting to bottle and pump at this time.  Reviewed cue based feed and early feeding cues as well as importance of every 3 hour breast stimulation.  Writer into assist with next feed at 1815 and mother states infant latched for 1 minute but then is not interested and was frustrated- currently doing STS.  Discussed practice feeds and using breast compressions while infant at breast and encouraged hand expression prior to latch.  Writer assisted with football hold and reviewed positioning techniques; infant attempting to latch but unable to grasp.  Discussed trying nipple shield however Em does not want to continue trying at this time and will bottle feed and pump.  Outpatient lactation resources provided and encouraged to call for continued lactation support.

## 2023-08-09 NOTE — PLAN OF CARE
Vital signs stable. Fundus firm, midline, at the umbilicus. Lochia rubra and scant. C/S incision covered with foam dressing, CDI. Armstrong catheter removed at 2300, has voided once without difficulty (250mL). Ambulating independently. Pain managed with Tylenol and Toradol. Pumping due to infant in the NICU, was able to go down and breastfeed during shift with minimal assistance. FOB at bedside and attentive to and supportive of patient. Bonding well with infant. Independent with self and infant cares. Encouraging pt to call with any questions or concerns. Will monitor as needed and continue with plan of care.

## 2023-08-09 NOTE — PROGRESS NOTES
COPIED FROM BABY'S CHART:    SW met with MOBEm to check in. SW explained SW's role & offered to complete an initial assessment. Em shared she is hopeful Eliseo miller will be going back to the  nursery soon so does not believe SW services is needed at this time. She voiced they have everything they need & no concerns. SW provided Resources for Parents pamphlet with SW's contact information & encouraged MOB to reach out to SW if needs or questions arise. SW can be re-consulted if needs arise.     GEO Peña  [facility]  2023  10:38 AM

## 2023-08-09 NOTE — PROGRESS NOTES
[facility]  POD#1    Pt seen at NICU      Pt doing well. Ambulating, tolerating PO. Decreased lochia. Pain controlled. Voiding without difficulties. Breast feeding and pumping. Visiting baby at NICU.    Vitals:    23 2100 23 0130 23 0530 23 0900   BP: 131/80 126/85 132/82 128/70   BP Location: Right arm Right arm Right arm Right arm   Patient Position: Semi-Raines's Semi-Raines's Semi-Raines's Semi-Raines's   Cuff Size: Adult Large Adult Large Adult Large Adult Large   Pulse:  85 84 100   Resp: 18 16 16 20   Temp: 98.8  F (37.1  C) 98.2  F (36.8  C) 98.1  F (36.7  C) 97.8  F (36.6  C)   TempSrc: Oral Oral Oral Oral   SpO2:  97% 97%    Weight:       Height:         Abd soft, NTGR, FFBU, no fundal tenderness, incision well approximated with stitches, OR dressing dry and clean removed without difficulties  Ext no edema, no cyanosis, pulses +    Lab Results   Component Value Date    HGB 9.4 2023       A/P 27 year old  at 39w3d s/p POD#1 scheduled rLTCS.       -hemodynamically stable   - ABLA, asymptomatic   - PO iron every other day ordered  -Rh +  -Diet regular  -Pain control with Tylenol, Motrin and Oxycodone  -DVT ppx - SCDs while on bed and ambulation  -bowel ppx- Senna/docusate, simethicone  -Breast feeding, encouraged, continue PNV's, proper hydration and caloric intake counseled.  -Tdap and flu given prenatally  -Rubella immune  -BC; not discussed      Plan; continue routine PP care    Dr. Fátima Benton  [phone]  2023 9:23 AM

## 2023-08-10 ENCOUNTER — LACTATION ENCOUNTER (OUTPATIENT)
Age: 28
End: 2023-08-10

## 2023-08-10 VITALS
TEMPERATURE: 97.9 F | DIASTOLIC BLOOD PRESSURE: 75 MMHG | BODY MASS INDEX: 41.95 KG/M2 | SYSTOLIC BLOOD PRESSURE: 125 MMHG | WEIGHT: 293 LBS | HEART RATE: 95 BPM | OXYGEN SATURATION: 97 % | HEIGHT: 70 IN | RESPIRATION RATE: 18 BRPM

## 2023-08-10 PROBLEM — O10.919 CHRONIC HYPERTENSION AFFECTING PREGNANCY: Status: ACTIVE | Noted: 2023-08-10

## 2023-08-10 PROCEDURE — 250N000011 HC RX IP 250 OP 636: Mod: JZ | Performed by: OBSTETRICS & GYNECOLOGY

## 2023-08-10 PROCEDURE — 250N000013 HC RX MED GY IP 250 OP 250 PS 637: Performed by: OBSTETRICS & GYNECOLOGY

## 2023-08-10 PROCEDURE — 90471 IMMUNIZATION ADMIN: CPT | Performed by: OBSTETRICS & GYNECOLOGY

## 2023-08-10 PROCEDURE — 90707 MMR VACCINE SC: CPT | Performed by: OBSTETRICS & GYNECOLOGY

## 2023-08-10 RX ORDER — FERROUS SULFATE 325(65) MG
325 TABLET ORAL EVERY OTHER DAY
Qty: 90 TABLET | Refills: 1 | Status: SHIPPED | OUTPATIENT
Start: 2023-08-11

## 2023-08-10 RX ORDER — OXYCODONE HYDROCHLORIDE 5 MG/1
5 TABLET ORAL EVERY 6 HOURS PRN
Qty: 12 TABLET | Refills: 0 | Status: SHIPPED | OUTPATIENT
Start: 2023-08-10

## 2023-08-10 RX ORDER — MODIFIED LANOLIN
OINTMENT (GRAM) TOPICAL
Qty: 7 G | Refills: 3 | Status: SHIPPED | OUTPATIENT
Start: 2023-08-10

## 2023-08-10 RX ORDER — SIMETHICONE 80 MG
80 TABLET,CHEWABLE ORAL 4 TIMES DAILY PRN
Qty: 40 TABLET | Refills: 0 | Status: SHIPPED | OUTPATIENT
Start: 2023-08-10

## 2023-08-10 RX ORDER — IBUPROFEN 800 MG/1
800 TABLET, FILM COATED ORAL EVERY 6 HOURS
Qty: 40 TABLET | Refills: 1 | Status: SHIPPED | OUTPATIENT
Start: 2023-08-10

## 2023-08-10 RX ORDER — AMOXICILLIN 250 MG
1 CAPSULE ORAL DAILY PRN
Qty: 30 TABLET | Refills: 0 | Status: SHIPPED | OUTPATIENT
Start: 2023-08-10

## 2023-08-10 RX ORDER — HYDROCORTISONE 25 MG/G
CREAM TOPICAL 3 TIMES DAILY PRN
Qty: 30 G | Refills: 0 | Status: SHIPPED | OUTPATIENT
Start: 2023-08-10

## 2023-08-10 RX ORDER — ACETAMINOPHEN 325 MG/1
975 TABLET ORAL EVERY 6 HOURS
Qty: 40 TABLET | Refills: 0 | Status: SHIPPED | OUTPATIENT
Start: 2023-08-10

## 2023-08-10 RX ADMIN — OXYCODONE HYDROCHLORIDE 5 MG: 5 TABLET ORAL at 02:14

## 2023-08-10 RX ADMIN — ACETAMINOPHEN 975 MG: 325 TABLET, FILM COATED ORAL at 04:28

## 2023-08-10 RX ADMIN — IBUPROFEN 800 MG: 800 TABLET ORAL at 12:04

## 2023-08-10 RX ADMIN — OXYCODONE HYDROCHLORIDE 5 MG: 5 TABLET ORAL at 10:19

## 2023-08-10 RX ADMIN — MEASLES, MUMPS, AND RUBELLA VIRUS VACCINE LIVE 0.5 ML: 1000; 12500; 1000 INJECTION, POWDER, LYOPHILIZED, FOR SUSPENSION SUBCUTANEOUS at 12:04

## 2023-08-10 RX ADMIN — ACETAMINOPHEN 975 MG: 325 TABLET, FILM COATED ORAL at 10:19

## 2023-08-10 RX ADMIN — ENOXAPARIN SODIUM 40 MG: 40 INJECTION SUBCUTANEOUS at 10:20

## 2023-08-10 RX ADMIN — IBUPROFEN 800 MG: 800 TABLET ORAL at 06:17

## 2023-08-10 RX ADMIN — OXYCODONE HYDROCHLORIDE 5 MG: 5 TABLET ORAL at 06:17

## 2023-08-10 ASSESSMENT — ACTIVITIES OF DAILY LIVING (ADL)
ADLS_ACUITY_SCORE: 21

## 2023-08-10 NOTE — PLAN OF CARE
Vital signs stable. Fundus firm, midline, 1cm below the umbilicus. Lochia rubra and scant. C/S incision closed with liquid bandage, approximated, CDI. Voiding without difficulty. Ambulating independently. Pain managed with Tylenol, Ibuprofen, and Oxycodone. Pumping and giving EBM, supplementing with donor milk. Occasionally breastfeeding with encouragement and stimulation due to infant being sleepy at breast. FOB not at bedside overnight, attentive to and supportive of patient when present. Bonding well with infant. Independent with self and infant cares. Encouraging pt to call with any questions or concerns. Will monitor as needed and continue with plan of care.

## 2023-08-10 NOTE — LACTATION NOTE
This note was copied from a baby's chart.  Lactation in to see patient before discharge home. Last minute questions answered. Getting 15 mls with pumping. Encouraged to continue to get baby to breast, then pump and supplement. Patient states infant getting to breast for short periods at a time. Patient will do EBM then formula. Has pump for home. Lactation resources given for home.

## 2023-08-10 NOTE — PROGRESS NOTES
[facility] Post-Op / Progress Note    Interval History   Doing well.  Pain is well-controlled.  No fevers.  No history of wound drainage, warmth or significant erythema.  Good appetite.  Denies chest pain, shortness of breath, nausea or vomiting.  Ambulatory.  Breastfeeding well.    Medications    dextrose 5% lactated ringers 125 mL/hr at 23 1510    - MEDICATION INSTRUCTIONS -      oxytocin in 0.9% NaCl      oxytocin in 0.9% NaCl        acetaminophen  975 mg Oral Q6H    enoxaparin ANTICOAGULANT  40 mg Subcutaneous Q12H    ferrous sulfate  325 mg Oral Every Other Day    ibuprofen  800 mg Oral Q6H    Measles, Mumps & Rubella Vac  0.5 mL Subcutaneous Once    senna-docusate  1 tablet Oral BID    Or    senna-docusate  2 tablet Oral BID    sodium chloride (PF)  3 mL Intracatheter Q8H       Physical Exam   Temp: 97.7  F (36.5  C) Temp src: Oral BP: 131/73 Pulse: 90   Resp: 16        Vitals:    23 1600 23 0936   Weight: (!) 173.3 kg (382 lb) (!) 172.8 kg (381 lb)     Vital Signs with Ranges  Temp:  [97.7  F (36.5  C)-98.3  F (36.8  C)] 97.7  F (36.5  C)  Pulse:  [] 90  Resp:  [16-20] 16  BP: (128-138)/(70-82) 131/73  I/O last 3 completed shifts:  In: -   Out: 350 [Urine:350]    Uterine fundus is firm, non-tender and at the level of the umbilicus  Incision C/D/I    Data   Recent Labs   Lab Test 23  0745   AS Negative     Recent Labs   Lab Test 23  0633 23  0745   HGB 9.4* 12.2     No lab results found.    Assessment & Plan   -2 Days Post-Op Procedure(s):  repeat  section    -hemodynamically stable             - ABLA, asymptomatic             - PO iron every other day ordered  -Rh +  -Diet regular  -Pain control with Tylenol, Motrin and Oxycodone  -DVT ppx - SCDs while on bed and ambulation  -bowel ppx- Senna/docusate, simethicone  -Breast feeding, encouraged, continue PNV's, proper hydration and caloric intake counseled.  -Tdap and flu given  prenatally  -Rubella immune  -BC; not discussed    Rubella Non-immune  -MMR given    Annamarie Argueta MD

## 2023-08-10 NOTE — PLAN OF CARE
Discharge instructions completed.  Patient states she understands all discharge instructions and all her questions have been answered.  Verbalizes when she needs to return to clinic for follow up for herself and baby.  She is caring for herself and her baby independently.  Prescriptions reviewed and sent to pharmacy.  Postpartum depression symptoms reviewed and encouraged frequent review of depression scale.

## 2023-08-26 ENCOUNTER — HEALTH MAINTENANCE LETTER (OUTPATIENT)
Age: 28
End: 2023-08-26

## 2024-10-19 ENCOUNTER — HEALTH MAINTENANCE LETTER (OUTPATIENT)
Age: 29
End: 2024-10-19

## (undated) DEVICE — STOCKING SLEEVE VASOPRESS COMPRESSION CALF MED VP501M

## (undated) DEVICE — SU PDS II 0 CT 36" Z358T

## (undated) DEVICE — GLOVE BIOGEL PI ULTRATOUCH SZ 7.5 41175

## (undated) DEVICE — SOL NACL 0.9% IRRIG 1000ML BOTTLE 2F7124

## (undated) DEVICE — ESU PENCIL SMOKE EVAC W/ROCKER SWITCH 0703-047-000

## (undated) DEVICE — DRSG ABDOMINAL 07 1/2X8" 7197D

## (undated) DEVICE — SOL WATER IRRIG 1000ML BOTTLE 2F7114

## (undated) DEVICE — PACK C-SECTION LF PL15OTA83B

## (undated) DEVICE — LINEN DRAPE 54X72" 5467

## (undated) DEVICE — GLOVE BIOGEL PI MICRO INDICATOR UNDERGLOVE SZ 6.5 48965

## (undated) DEVICE — TRANSFER DEVICE BLOOD NDL HOLDER 364880

## (undated) DEVICE — PREP CHLORAPREP 26ML TINTED HI-LITE ORANGE 930815

## (undated) DEVICE — SU MONOCRYL 4-0 PS-2 27" UND Y426H

## (undated) DEVICE — SU VICRYL 0 CT-1 36" J346H

## (undated) DEVICE — GLOVE BIOGEL PI MICRO INDICATOR UNDERGLOVE SZ 8.0 48980

## (undated) DEVICE — CATH TRAY FOLEY 16FR SILICONE 907416

## (undated) DEVICE — RETR PANNICULUS TRAXI FOR PT POSITIONING PRS-1030

## (undated) DEVICE — LINEN HALF SHEET 5512

## (undated) DEVICE — BLADE KNIFE SURG 10 371110

## (undated) DEVICE — SU VICRYL 2-0 CT-1 27" UND J259H

## (undated) DEVICE — SU MONOCRYL 0 CT-1 36" UND Y946H

## (undated) DEVICE — CAP BABY PINK/BLUE IC-2

## (undated) DEVICE — SU DERMABOND ADVANCED .7ML DNX12

## (undated) DEVICE — GLOVE BIOGEL PI MICRO SZ 6.0 48560

## (undated) DEVICE — ESU GROUND PAD ADULT W/CORD E7507

## (undated) DEVICE — BAG CLEAR TRASH 1.3M 39X33" P4040C

## (undated) DEVICE — GLOVE BIOGEL PI MICRO SZ 6.5 48565

## (undated) DEVICE — LINEN TOWEL PACK X10 5473

## (undated) DEVICE — LINEN FULL SHEET 5511

## (undated) DEVICE — Device

## (undated) DEVICE — DECANTER BAG 2002S

## (undated) RX ORDER — DEXAMETHASONE SODIUM PHOSPHATE 4 MG/ML
INJECTION, SOLUTION INTRA-ARTICULAR; INTRALESIONAL; INTRAMUSCULAR; INTRAVENOUS; SOFT TISSUE
Status: DISPENSED
Start: 2023-08-08

## (undated) RX ORDER — FENTANYL CITRATE-0.9 % NACL/PF 10 MCG/ML
PLASTIC BAG, INJECTION (ML) INTRAVENOUS
Status: DISPENSED
Start: 2023-08-08

## (undated) RX ORDER — KETOROLAC TROMETHAMINE 30 MG/ML
INJECTION, SOLUTION INTRAMUSCULAR; INTRAVENOUS
Status: DISPENSED
Start: 2023-08-08

## (undated) RX ORDER — TRANEXAMIC ACID 100 MG/ML
INJECTION, SOLUTION INTRAVENOUS
Status: DISPENSED
Start: 2023-08-08

## (undated) RX ORDER — FENTANYL CITRATE 50 UG/ML
INJECTION, SOLUTION INTRAMUSCULAR; INTRAVENOUS
Status: DISPENSED
Start: 2023-08-08

## (undated) RX ORDER — ONDANSETRON 2 MG/ML
INJECTION INTRAMUSCULAR; INTRAVENOUS
Status: DISPENSED
Start: 2023-08-08

## (undated) RX ORDER — OXYTOCIN/0.9 % SODIUM CHLORIDE 30/500 ML
PLASTIC BAG, INJECTION (ML) INTRAVENOUS
Status: DISPENSED
Start: 2023-08-08

## (undated) RX ORDER — MORPHINE SULFATE 1 MG/ML
INJECTION, SOLUTION EPIDURAL; INTRATHECAL; INTRAVENOUS
Status: DISPENSED
Start: 2023-08-08

## (undated) RX ORDER — EPHEDRINE SULFATE 50 MG/ML
INJECTION, SOLUTION INTRAMUSCULAR; INTRAVENOUS; SUBCUTANEOUS
Status: DISPENSED
Start: 2023-08-08